# Patient Record
Sex: FEMALE | Race: WHITE | URBAN - METROPOLITAN AREA
[De-identification: names, ages, dates, MRNs, and addresses within clinical notes are randomized per-mention and may not be internally consistent; named-entity substitution may affect disease eponyms.]

---

## 2018-03-16 ENCOUNTER — HOSPITAL ENCOUNTER (OUTPATIENT)
Dept: CT IMAGING | Age: 53
Discharge: HOME | End: 2018-03-16
Attending: INTERNAL MEDICINE | Admitting: INTERNAL MEDICINE

## 2018-03-16 VITALS
RESPIRATION RATE: 15 BRPM | OXYGEN SATURATION: 98 % | SYSTOLIC BLOOD PRESSURE: 138 MMHG | DIASTOLIC BLOOD PRESSURE: 86 MMHG | HEART RATE: 52 BPM

## 2018-03-16 DIAGNOSIS — E78.00 PURE HYPERCHOLESTEROLEMIA: ICD-10-CM

## 2018-03-16 DIAGNOSIS — Z82.49 FAMILY HISTORY OF HEART DISEASE: ICD-10-CM

## 2018-03-16 NOTE — PROGRESS NOTES
Patient was observed in the post bay. VS were within normal limits. Patient had no c/o at this time. Patient escorted to exit at d/c.

## 2018-03-16 NOTE — PROGRESS NOTES
Patient arrived alert and orientated x 4, ambulatory, denies pain. Initial HR 67, SR with multiple PVC's, 152/96   , 97% on RA. CTA Coronary study done without complication. Patient given IV Metoprolol 5 mg in and 1 SL nitro. See flow sheets for VS.  Patient was taken to [facility] after study and recovered for 30 minutes.